# Patient Record
Sex: MALE | Race: BLACK OR AFRICAN AMERICAN | NOT HISPANIC OR LATINO | Employment: OTHER | URBAN - METROPOLITAN AREA
[De-identification: names, ages, dates, MRNs, and addresses within clinical notes are randomized per-mention and may not be internally consistent; named-entity substitution may affect disease eponyms.]

---

## 2017-01-11 ENCOUNTER — TELEPHONE (OUTPATIENT)
Dept: FAMILY MEDICINE | Facility: CLINIC | Age: 62
End: 2017-01-11

## 2017-01-11 DIAGNOSIS — Z01.00 EYE EXAM, ROUTINE: Primary | ICD-10-CM

## 2017-01-11 NOTE — TELEPHONE ENCOUNTER
----- Message from Saskia Calderon sent at 1/11/2017 11:29 AM CST -----  Contact: self  Patient was last seen 3/30/2014  He is calling for a [facility] referral to an eye doctor to have an eye exam.  [facility]    Please call  or cell     Thanks

## 2017-01-12 ENCOUNTER — TELEPHONE (OUTPATIENT)
Dept: FAMILY MEDICINE | Facility: CLINIC | Age: 62
End: 2017-01-12

## 2017-01-12 NOTE — TELEPHONE ENCOUNTER
----- Message from Nancy Kolb sent at 1/12/2017 10:44 AM CST -----  Contact: Nirav  Patient is returning call. Please call cell [phone]. Thanks!

## 2017-01-12 NOTE — TELEPHONE ENCOUNTER
i spoke with pt we are working on his referral to his eye md . He understands this and will be making her appointment

## 2019-05-02 ENCOUNTER — TELEPHONE (OUTPATIENT)
Dept: FAMILY MEDICINE | Facility: CLINIC | Age: 64
End: 2019-05-02

## 2019-05-02 DIAGNOSIS — H26.9 CATARACT, UNSPECIFIED CATARACT TYPE, UNSPECIFIED LATERALITY: Primary | ICD-10-CM

## 2019-05-02 NOTE — TELEPHONE ENCOUNTER
I tried to call pt again I left a message for him to call back on mobile number tried home number no answer, no voice mail, tried work number it is not a working number.

## 2019-05-02 NOTE — TELEPHONE ENCOUNTER
----- Message from Tiffani Zuniga sent at 5/2/2019 10:34 AM CDT -----  Contact: pt  Type:  Patient Requesting Referral    Who Called:pt  Does the patient already have the specialty appointment scheduled?:no  If yes, what is the date of that appointment?:.  Referral to What Specialty:[facility]  Reason for Referral:Laser Surgery for Cataracts  Does the patient want the referral with a specific physician?:yes  Is the specialist an [facility]?:no  Patient Requesting a Response?:yes  Would the patient rather a call back or a response via [facility]? Call back  Best Call Back Number:[phone]  Additional Information: .    Thank you

## 2019-05-02 NOTE — TELEPHONE ENCOUNTER
Tried to call pt no answer. No voice mail   I called optometry office she said he would see Marco A Munoz for cataracts pt is .

## 2019-05-02 NOTE — TELEPHONE ENCOUNTER
We have not seen him for 5 years.    I do not know if he is taking blood pressure medicines or other factors that would be important for cataract surgery.    I do not know who is taking care of his blood pressure, but if he is seeing another primary care, that person should write the referral.    I have no issue doing a referral, but I do have concerns about the patient's safety as we have not seen him.    Carlos Saenz MD

## 2019-05-03 NOTE — TELEPHONE ENCOUNTER
I spoke with Mr waters he said that he called [facility] because he has transportation issues and they told him the closest md to him for primary is Dr. Mead. He will go to Dr. Mead for his primary care md. [facility] but he is now going to go to Dr. Mead. He has an appointment scheduled.

## 2019-06-06 PROBLEM — E78.49 OTHER HYPERLIPIDEMIA: Status: ACTIVE | Noted: 2019-06-06

## 2019-06-06 PROBLEM — E11.65 TYPE 2 DIABETES MELLITUS WITH HYPERGLYCEMIA, WITHOUT LONG-TERM CURRENT USE OF INSULIN: Status: ACTIVE | Noted: 2019-06-06

## 2019-12-04 PROBLEM — J41.0 SIMPLE CHRONIC BRONCHITIS: Status: ACTIVE | Noted: 2019-12-04

## 2021-05-04 ENCOUNTER — PATIENT MESSAGE (OUTPATIENT)
Dept: RESEARCH | Facility: HOSPITAL | Age: 66
End: 2021-05-04
